# Patient Record
Sex: FEMALE | Race: WHITE | NOT HISPANIC OR LATINO | URBAN - METROPOLITAN AREA
[De-identification: names, ages, dates, MRNs, and addresses within clinical notes are randomized per-mention and may not be internally consistent; named-entity substitution may affect disease eponyms.]

---

## 2017-01-04 NOTE — PATIENT DISCUSSION
(H52.6) Other disorders of refraction - Assesment : Accommodative insufficiency. - Plan : Monitor for changes.  Take 10-15 minutes breaks when doing near work Recommend mild correction OTC readers for near work Rtc 2 year Exam

## 2018-02-20 ENCOUNTER — IMPORTED ENCOUNTER (OUTPATIENT)
Dept: URBAN - METROPOLITAN AREA CLINIC 50 | Facility: CLINIC | Age: 68
End: 2018-02-20

## 2018-02-27 ENCOUNTER — IMPORTED ENCOUNTER (OUTPATIENT)
Dept: URBAN - METROPOLITAN AREA CLINIC 50 | Facility: CLINIC | Age: 68
End: 2018-02-27

## 2018-05-08 ENCOUNTER — IMPORTED ENCOUNTER (OUTPATIENT)
Dept: URBAN - METROPOLITAN AREA CLINIC 50 | Facility: CLINIC | Age: 68
End: 2018-05-08

## 2021-04-17 ASSESSMENT — VISUAL ACUITY
OD_CC: J1
OS_PH: 20/25
OS_CC: 20/30+1
OS_BAT: 20/40
OS_CC: 20/30
OS_OTHER: 20/40. 20/80.
OD_OTHER: 20/25. 20/70.
OD_BAT: 20/25
OD_CC: 20/20-1
OD_CC: 20/20
OS_CC: J1

## 2021-04-17 ASSESSMENT — TONOMETRY
OD_IOP_MMHG: 10
OS_IOP_MMHG: 10
OD_IOP_MMHG: 07
OS_IOP_MMHG: 09